# Patient Record
Sex: MALE | Race: WHITE | NOT HISPANIC OR LATINO | Employment: UNEMPLOYED
[De-identification: names, ages, dates, MRNs, and addresses within clinical notes are randomized per-mention and may not be internally consistent; named-entity substitution may affect disease eponyms.]

---
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
## 2024-01-29 ENCOUNTER — TELEPHONE (OUTPATIENT)
Dept: OTOLARYNGOLOGY | Facility: CLINIC | Age: 19
End: 2024-01-29

## 2024-01-29 ENCOUNTER — HOSPITAL ENCOUNTER (EMERGENCY)
Facility: HOSPITAL | Age: 19
Discharge: HOME | End: 2024-01-29
Attending: EMERGENCY MEDICINE | Admitting: EMERGENCY MEDICINE
Payer: COMMERCIAL

## 2024-01-29 ENCOUNTER — APPOINTMENT (OUTPATIENT)
Dept: CT IMAGING | Facility: HOSPITAL | Age: 19
End: 2024-01-29
Attending: EMERGENCY MEDICINE
Payer: COMMERCIAL

## 2024-01-29 VITALS
HEIGHT: 70 IN | DIASTOLIC BLOOD PRESSURE: 61 MMHG | HEART RATE: 79 BPM | BODY MASS INDEX: 21.47 KG/M2 | RESPIRATION RATE: 16 BRPM | OXYGEN SATURATION: 97 % | TEMPERATURE: 99.2 F | WEIGHT: 150 LBS | SYSTOLIC BLOOD PRESSURE: 116 MMHG

## 2024-01-29 DIAGNOSIS — R22.1 MASS OF RIGHT SIDE OF NECK: ICD-10-CM

## 2024-01-29 DIAGNOSIS — K11.6 CYST OF RIGHT PAROTID GLAND: Primary | ICD-10-CM

## 2024-01-29 DIAGNOSIS — J02.0 STREP PHARYNGITIS: ICD-10-CM

## 2024-01-29 DIAGNOSIS — L03.031 PARONYCHIA OF GREAT TOE OF RIGHT FOOT: ICD-10-CM

## 2024-01-29 LAB
ALBUMIN SERPL BCG-MCNC: 4 G/DL (ref 3.5–5.2)
ALP SERPL-CCNC: 78 U/L (ref 40–150)
ALT SERPL W P-5'-P-CCNC: 12 U/L (ref 0–70)
ANION GAP SERPL CALCULATED.3IONS-SCNC: 10 MMOL/L (ref 7–15)
AST SERPL W P-5'-P-CCNC: 15 U/L (ref 0–45)
BASOPHILS # BLD AUTO: 0 10E3/UL (ref 0–0.2)
BASOPHILS NFR BLD AUTO: 0 %
BILIRUB SERPL-MCNC: 0.4 MG/DL
BUN SERPL-MCNC: 10.7 MG/DL (ref 6–20)
CALCIUM SERPL-MCNC: 9.6 MG/DL (ref 8.6–10)
CHLORIDE SERPL-SCNC: 104 MMOL/L (ref 98–107)
CREAT SERPL-MCNC: 1.05 MG/DL (ref 0.67–1.17)
DEPRECATED HCO3 PLAS-SCNC: 26 MMOL/L (ref 22–29)
EGFRCR SERPLBLD CKD-EPI 2021: >90 ML/MIN/1.73M2
EOSINOPHIL # BLD AUTO: 0 10E3/UL (ref 0–0.7)
EOSINOPHIL NFR BLD AUTO: 0 %
ERYTHROCYTE [DISTWIDTH] IN BLOOD BY AUTOMATED COUNT: 11.6 % (ref 10–15)
FLUAV RNA SPEC QL NAA+PROBE: NEGATIVE
FLUBV RNA RESP QL NAA+PROBE: NEGATIVE
GLUCOSE SERPL-MCNC: 101 MG/DL (ref 70–99)
GROUP A STREP BY PCR: DETECTED
HCT VFR BLD AUTO: 43.9 % (ref 40–53)
HGB BLD-MCNC: 15.3 G/DL (ref 13.3–17.7)
IMM GRANULOCYTES # BLD: 0.1 10E3/UL
IMM GRANULOCYTES NFR BLD: 1 %
LYMPHOCYTES # BLD AUTO: 0.9 10E3/UL (ref 0.8–5.3)
LYMPHOCYTES NFR BLD AUTO: 6 %
MCH RBC QN AUTO: 30.7 PG (ref 26.5–33)
MCHC RBC AUTO-ENTMCNC: 34.9 G/DL (ref 31.5–36.5)
MCV RBC AUTO: 88 FL (ref 78–100)
MONOCYTES # BLD AUTO: 1 10E3/UL (ref 0–1.3)
MONOCYTES NFR BLD AUTO: 7 %
NEUTROPHILS # BLD AUTO: 13.4 10E3/UL (ref 1.6–8.3)
NEUTROPHILS NFR BLD AUTO: 86 %
NRBC # BLD AUTO: 0 10E3/UL
NRBC BLD AUTO-RTO: 0 /100
PLATELET # BLD AUTO: 266 10E3/UL (ref 150–450)
POTASSIUM SERPL-SCNC: 4.4 MMOL/L (ref 3.4–5.3)
PROT SERPL-MCNC: 7.6 G/DL (ref 6.4–8.3)
RBC # BLD AUTO: 4.98 10E6/UL (ref 4.4–5.9)
RSV RNA SPEC NAA+PROBE: NEGATIVE
SARS-COV-2 RNA RESP QL NAA+PROBE: NEGATIVE
SODIUM SERPL-SCNC: 140 MMOL/L (ref 135–145)
TSH SERPL DL<=0.005 MIU/L-ACNC: 0.68 UIU/ML (ref 0.3–4.2)
WBC # BLD AUTO: 15.5 10E3/UL (ref 4–11)

## 2024-01-29 PROCEDURE — 10060 I&D ABSCESS SIMPLE/SINGLE: CPT

## 2024-01-29 PROCEDURE — 99285 EMERGENCY DEPT VISIT HI MDM: CPT | Mod: 25

## 2024-01-29 PROCEDURE — 84443 ASSAY THYROID STIM HORMONE: CPT | Performed by: EMERGENCY MEDICINE

## 2024-01-29 PROCEDURE — 87637 SARSCOV2&INF A&B&RSV AMP PRB: CPT | Performed by: EMERGENCY MEDICINE

## 2024-01-29 PROCEDURE — 96375 TX/PRO/DX INJ NEW DRUG ADDON: CPT | Mod: 59

## 2024-01-29 PROCEDURE — 80053 COMPREHEN METABOLIC PANEL: CPT | Performed by: EMERGENCY MEDICINE

## 2024-01-29 PROCEDURE — 36415 COLL VENOUS BLD VENIPUNCTURE: CPT | Performed by: EMERGENCY MEDICINE

## 2024-01-29 PROCEDURE — 250N000011 HC RX IP 250 OP 636: Performed by: EMERGENCY MEDICINE

## 2024-01-29 PROCEDURE — 96374 THER/PROPH/DIAG INJ IV PUSH: CPT | Mod: 59

## 2024-01-29 PROCEDURE — 85025 COMPLETE CBC W/AUTO DIFF WBC: CPT | Performed by: EMERGENCY MEDICINE

## 2024-01-29 PROCEDURE — 87651 STREP A DNA AMP PROBE: CPT | Performed by: EMERGENCY MEDICINE

## 2024-01-29 PROCEDURE — 70491 CT SOFT TISSUE NECK W/DYE: CPT

## 2024-01-29 PROCEDURE — 250N000011 HC RX IP 250 OP 636: Mod: JZ | Performed by: EMERGENCY MEDICINE

## 2024-01-29 RX ORDER — METHYLPREDNISOLONE 4 MG
TABLET, DOSE PACK ORAL
Qty: 21 TABLET | Refills: 0 | Status: SHIPPED | OUTPATIENT
Start: 2024-01-29 | End: 2024-01-29

## 2024-01-29 RX ORDER — METHYLPREDNISOLONE 4 MG
TABLET, DOSE PACK ORAL
Qty: 21 TABLET | Refills: 0 | Status: SHIPPED | OUTPATIENT
Start: 2024-01-29

## 2024-01-29 RX ORDER — AMPICILLIN AND SULBACTAM 2; 1 G/1; G/1
3 INJECTION, POWDER, FOR SOLUTION INTRAMUSCULAR; INTRAVENOUS ONCE
Status: COMPLETED | OUTPATIENT
Start: 2024-01-29 | End: 2024-01-29

## 2024-01-29 RX ORDER — IOPAMIDOL 755 MG/ML
90 INJECTION, SOLUTION INTRAVASCULAR ONCE
Status: COMPLETED | OUTPATIENT
Start: 2024-01-29 | End: 2024-01-29

## 2024-01-29 RX ORDER — DEXAMETHASONE SODIUM PHOSPHATE 4 MG/ML
10 INJECTION, SOLUTION INTRA-ARTICULAR; INTRALESIONAL; INTRAMUSCULAR; INTRAVENOUS; SOFT TISSUE ONCE
Status: COMPLETED | OUTPATIENT
Start: 2024-01-29 | End: 2024-01-29

## 2024-01-29 RX ADMIN — DEXAMETHASONE SODIUM PHOSPHATE 10 MG: 4 INJECTION, SOLUTION INTRA-ARTICULAR; INTRALESIONAL; INTRAMUSCULAR; INTRAVENOUS; SOFT TISSUE at 14:17

## 2024-01-29 RX ADMIN — AMPICILLIN SODIUM AND SULBACTAM SODIUM 3 G: 2; 1 INJECTION, POWDER, FOR SOLUTION INTRAMUSCULAR; INTRAVENOUS at 14:17

## 2024-01-29 RX ADMIN — IOPAMIDOL 90 ML: 755 INJECTION, SOLUTION INTRAVENOUS at 12:26

## 2024-01-29 ASSESSMENT — ACTIVITIES OF DAILY LIVING (ADL)
ADLS_ACUITY_SCORE: 35

## 2024-01-29 NOTE — PROGRESS NOTES
Met with patient  to review role of care management, progression of care and possible need for services at discharge, including OP services, home care, or skilled nursing care. Patient alert, oriented and engaged in the conversation.     Pt comes from [facility]. Prescriptions to be sent to [facility] in [address] [place] [phone]. Updated MD for escript to sent there. Staff to  patient.

## 2024-01-29 NOTE — DISCHARGE INSTRUCTIONS
I spoke with Dr. Roy with the ear nose and throat specialist who has placed a referral for you to see the ear nose and throat specialist as soon as possible at the [facility].  He also ordered an outpatient fine-needle aspiration of the cystic area to determine what this is exactly.  They should contact you to schedule both this as well as your follow-up appointment but if you do not hear from them please call the number above.  Continue the Augmentin orally for 10 days as prescribed.  Take the Medrol Dosepak steroids as prescribed.     Soak the right toe in warm water ideally about 4 times a day to promote continued drainage.  Can follow-up with your primary care doctor for this.     Return to the ER for any new or worsening concerns including worsening neck swelling, difficulty breathing, fever, difficulty swallowing, or any new or worsening concerns.

## 2024-01-29 NOTE — TELEPHONE ENCOUNTER
[facility] Call Center    Phone Message    May a detailed message be left on voicemail: yes     Reason for Call: Other: per referral Cyst of right parotid gland priority 1-2 weeks per  patient ok with being seen @ [facility] please call mom as patient is in treatment she is assisting in appointment set up      Action Taken: Other: ENT    Travel Screening: Not Applicable

## 2024-01-29 NOTE — PROGRESS NOTES
Orders placed for FNA of deep lobe parotid cyst with referral to [facility].

## 2024-01-29 NOTE — ED TRIAGE NOTES
Pt presents to triage from home. Pt reports neck pain and swelling, as well as throat pain, since yesterday. Denies difficulty breathing or airway swelling. Pt also reports right great toe pain. Pt last took ibuprofen at 0815.     Triage Assessment (Adult)       Row Name 01/29/24 0845          Triage Assessment    Airway WDL WDL        Respiratory WDL    Respiratory WDL WDL        Skin Circulation/Temperature WDL    Skin Circulation/Temperature WDL WDL        Cardiac WDL    Cardiac WDL WDL        Peripheral/Neurovascular WDL    Peripheral Neurovascular WDL WDL        Cognitive/Neuro/Behavioral WDL    Cognitive/Neuro/Behavioral WDL WDL

## 2024-01-29 NOTE — ED PROVIDER NOTES
EMERGENCY DEPARTMENT ENCOUNTER      NAME: Jeanette Begum  AGE: 18 year old male  YOB: 2005  MRN: [id number]  EVALUATION DATE & TIME: 1/29/2024  9:45 AM    PCP: Tierney Cardozo [place]    ED PROVIDER: Yvette Monte MD      Chief Complaint   Patient presents with    Pharyngitis    Neck Pain    Toe Pain         FINAL IMPRESSION:  1. Mass of right side of neck    2. Strep pharyngitis    3. Paronychia of great toe of right foot          ED COURSE & MEDICAL DECISION MAKING:    Pertinent Labs & Imaging studies reviewed. (See chart for details)    10:40 AM I introduced myself to the patient, obtained patient history, performed a physical exam, and discussed plan for ED workup including potential diagnostic laboratory/imaging studies and interventions.    18 year old male presents to the Emergency Department for evaluation of right sided neck swelling and right great toe pain.  In terms of the right great toe pain the patient has a paronychia which was anesthetized using a digital block of the right great toe and then drained as documented below.  He tolerated this well.  His bigger concern is the swelling on the right side of his neck that reportedly has developed over the last 24 hours.  Has also had some night sweats which is somewhat concerning for potential malignancy.  Do feel he warrants CT imaging of the neck area to evaluate this further.  Interestingly he does not have any overlying skin changes or signs of cellulitis on external exam.  Dentition is normal and no obvious sign of any dental abscess that would be tracking downward.  Also has no swelling over the parotid gland to suggest parotitis.  This is actually below the jawline.  It is firm and nonmobile and not fluctuant.  Thus felt that an abscess was less likely but obviously on the differential.  Again did have concern for the potential of malignancy as well.  Could also be a very enlarged lymph node however this would be  significantly enlarged.  Laboratory studies were obtained.  We did obtain a TSH although this is not directly over the thyroid.  This is within normal limits.  COVID-19 RSV and influenza PCR was obtained and negative.  He does not have any signs of peritonsillar abscess or epiglottitis on exam.  Did consider the potential of a retropharyngeal abscess however this to be an odd location for this.  Group A strep PCR is positive.  His oropharynx exam is actually somewhat benign with only some mild erythema.  No exudates.  Again no sign of peritonsillar abscess.  He has no signs of respiratory distress and no stridor.  He has no trismus and is tolerating secretions without difficulty.  He also has full range of motion of the neck without meningismus.  White blood cell count is elevated at 15.5.  Hemoglobin 15.3.  CMP is largely unremarkable.  He declined any need for pain medication at this time.      CT soft tissue neck with contrast reveals A cystic mass within the deep lobe of the parotid gland measuring 1.6 x 1.9 cm.  This may represent a type II brachial cleft cyst however a cystic and necrotic neoplasm is also on the differential diagnosis.  Recommend consultation with the ENT for further evaluation.  Mildly enlarged right level 2 and 3 lymph nodes which may be reactive.  Infiltration of subcutaneous fat within the right parotid and submandibular space concerning for surrounding cellulitis.  I did contact Dr. Roy with ENT and his recommendations are documented below.  Patient was given a dose of IV Unasyn here as well as 10 mg of IV Decadron.  Will be prescribed a 10-day course of Augmentin and a Medrol Dosepak per ENT recommendations as well.  Had long discussion with the patient, his father, and his mother via phone about the results and the significant importance that he follow-up closely with ENT as he will need a fine-needle aspiration/biopsy to further determine what this area is.  We did discuss the  potential of malignancy and thus the importance of close follow-up.  Discussed that we will be treating for possible infectious causes with the antibiotics and that the steroids are to reduce inflammation.  They voiced understanding and were comfortable with the plan and discharge.  ENT did not feel that the patient warranted admission and that they could arrange close outpatient follow-up for him.  Dr. Roy did place an order for the FNA as well as follow-up with the [facility] ENT team as soon as possible.  Patient and his family were given follow-up number and information.  Also discussed soaking the right great toe paronychia to promote continued drainage.  Antibiotics will also cover this as well.  Given very strict return precautions to the ER.  They voiced understanding.  Patient was discharged home in stable condition.    ED Course as of 01/30/24 2244   Mon Jan 29, 2024   1140 Soft tissue neck CT w contrast   1329 Dr. Gregg Roy who recommends dose of IV unasyn here and then 10 day course of Augmentin and 10 mg of decadron IV here and then medrol dose pack. He will ensure patient is contacted for follow up at the [facility] and is ordering an outpatient FNA.       At the conclusion of the encounter I discussed the results of all of the tests and the disposition. The questions were answered. The patient or family acknowledged understanding and was agreeable with the care plan.         Medical Decision Making  Obtained supplemental history:Supplemental history obtained?: Documented in chart  Reviewed external records: External records reviewed?: Documented in chart  Care impacted by chronic illness:Mental Health  Care significantly affected by social determinants of health:Access to Medical Care  Did you consider but not order tests?: Work up considered but not performed and documented in chart, if applicable  Did you interpret images independently?: Independent interpretation of ECG and images  noted in documentation, when applicable.  Consultation discussion with other provider:Did you involve another provider (consultant, , pharmacy, etc.)?: I discussed the care with another health care provider, see documentation for details.  Discharge. I prescribed additional prescription strength medication(s) as charted. See documentation for any additional details.      MEDICATIONS GIVEN IN THE EMERGENCY:  Medications   iopamidol (ISOVUE-370) solution 90 mL (90 mLs Intravenous $Given 1/29/24 1226)   ampicillin-sulbactam (UNASYN) 3 g vial to attach to  mL bag (0 g Intravenous Stopped 1/29/24 1432)   dexAMETHasone (DECADRON) injection 10 mg (10 mg Intravenous $Given 1/29/24 1417)       NEW PRESCRIPTIONS STARTED AT TODAY'S ER VISIT  Discharge Medication List as of 1/29/2024  3:53 PM             =================================================================    HPI    Patient information was obtained from: Patient, father      Jeanette Begum is a 18 year old male with a pertinent history of alcohol and cannabis abuse currently in a treatment facility, anxiety, depression who presents to this ED for evaluation of right sided neck swelling.  Patient reports that starting yesterday he started to note that his right side of his neck became swollen.  He reports some mild sore throat as well.  He states the area of the swelling on the neck is painful.  Denies any difficulty breathing or swallowing.  Denies any chest pain.  Denies any known fevers.  States he is currently in a treatment facility for prior alcohol and marijuana abuse.  He has not ever had swelling like this in the past.  Denies any known trauma.  Has normal range of motion of the neck.  No known sick contacts.  Denies any shortness of breath.  No nausea, vomiting, diarrhea, abdominal pain, or other complaints.  He does however state that he has been having some night sweats over the past 3 weeks or so.  He last took ibuprofen at 815 this morning.    He  "also complains of pain and swelling near his right great toenail that has been ongoing for about a week.  He has been soaking it at home and having some purulent drainage.  Is ambulating without difficulty.  Denies any numbness, tingling, or weakness.      REVIEW OF SYSTEMS   Review of Systems   Pertinent positives and negatives are documented in the HPI. All other systems reviewed and are negative.      PAST MEDICAL HISTORY:  No past medical history on file.    PAST SURGICAL HISTORY:  No past surgical history on file.        CURRENT MEDICATIONS:    amoxicillin-clavulanate (AUGMENTIN) 875-125 MG tablet  methylPREDNISolone (MEDROL DOSEPAK) 4 MG tablet therapy pack        ALLERGIES:  No Known Allergies    FAMILY HISTORY:  No family history on file.    SOCIAL HISTORY:   Social History     Socioeconomic History    Marital status: Single       VITALS:  /61   Pulse 79   Temp 99.2  F (37.3  C) (Oral)   Resp 16   Ht 1.778 m (5' 10\")   Wt 68 kg (150 lb)   SpO2 97%   BMI 21.52 kg/m      PHYSICAL EXAM    Physical Exam  Constitutional: Well developed, Well nourished, NAD, GCS 15  HENT: Normocephalic, Atraumatic, Bilateral external ears normal, TMs normal bilaterally, Oropharynx normal, mild tonsillar erythema, uvula is midline and non swollen, no exudate, tolerating secretions without difficulty, no trismus, voice is normal, normal dentition, mucous membranes moist, Nose normal. Neck- Normal range of motion, Supple, No stridor. Large area of right sided lateral neck swelling starting inferior to the jaw line and extending down into the neck, this area is firm, no fluctuance, no overlying erythema or rash, it is not pulsatile, non mobile, mildly tender, no crepitus    Eyes: PERRL, EOMI, Conjunctiva normal, No discharge.   Respiratory: Normal breath sounds, No respiratory distress, No wheezing or crackles, Speaks in full sentences easily.    Cardiovascular: Normal heart rate, Regular rhythm,  No murmurs, No rubs, No " gallops. 2+ radial pulses bilaterally  GI: Bowel sounds normal, Soft, No tenderness, No masses, No rebound or guarding.  Musculoskeletal: 2+ DP pulses. No notable lower extremity edema.  No cyanosis, No clubbing. Good range of motion in all major joints. No tenderness to palpation or major deformities noted. No tenderness of the CTLS spine.   Integument: Warm, Dry, right great toe with paronychia.  No spreading erythema or swelling of the toe otherwise.  Normal range of motion of the right great toe.  Neurologic: Alert & oriented x 3, 5/5 strength in all 4 extremities bilaterally. Sensation intact to light touch in all 4 extremities and the face bilaterally. No focal deficits noted. Normal gait.     Psychiatric: Affect normal, Judgment normal, Mood normal. Cooperative.      LAB:  All pertinent labs reviewed and interpreted.  Results for orders placed or performed during the hospital encounter of 01/29/24   Soft tissue neck CT w contrast    Impression    IMPRESSION:   1.  A marker was placed overlying the right neck mass in the region of the patient's palpable abnormality. Subjacent to the marker is a cystic mass within the deep lobe of the parotid gland measuring 1.6 x 1.9 cm. This may represent a type II brachial   cleft cyst, however a cystic necrotic neoplasm is also on the differential diagnosis. Recommend consultation with otolaryngology for further evaluation.  2.  Mildly enlarged right level II and III lymph nodes, which may be reactive.  3.  Infiltration of the subcutaneous fat within the right parotid and submandibular space, concerning for surrounding cellulitis.    Comprehensive metabolic panel   Result Value Ref Range    Sodium 140 135 - 145 mmol/L    Potassium 4.4 3.4 - 5.3 mmol/L    Carbon Dioxide (CO2) 26 22 - 29 mmol/L    Anion Gap 10 7 - 15 mmol/L    Urea Nitrogen 10.7 6.0 - 20.0 mg/dL    Creatinine 1.05 0.67 - 1.17 mg/dL    GFR Estimate >90 >60 mL/min/1.73m2    Calcium 9.6 8.6 - 10.0 mg/dL     Chloride 104 98 - 107 mmol/L    Glucose 101 (H) 70 - 99 mg/dL    Alkaline Phosphatase 78 40 - 150 U/L    AST 15 0 - 45 U/L    ALT 12 0 - 70 U/L    Protein Total 7.6 6.4 - 8.3 g/dL    Albumin 4.0 3.5 - 5.2 g/dL    Bilirubin Total 0.4 <=1.2 mg/dL   Symptomatic Influenza A/B, RSV, & SARS-CoV2 PCR (COVID-19) Nasopharyngeal    Specimen: Nasopharyngeal; Swab   Result Value Ref Range    Influenza A PCR Negative Negative    Influenza B PCR Negative Negative    RSV PCR Negative Negative    SARS CoV2 PCR Negative Negative   TSH with free T4 reflex   Result Value Ref Range    TSH 0.68 0.30 - 4.20 uIU/mL   CBC with platelets and differential   Result Value Ref Range    WBC Count 15.5 (H) 4.0 - 11.0 10e3/uL    RBC Count 4.98 4.40 - 5.90 10e6/uL    Hemoglobin 15.3 13.3 - 17.7 g/dL    Hematocrit 43.9 40.0 - 53.0 %    MCV 88 78 - 100 fL    MCH 30.7 26.5 - 33.0 pg    MCHC 34.9 31.5 - 36.5 g/dL    RDW 11.6 10.0 - 15.0 %    Platelet Count 266 150 - 450 10e3/uL    % Neutrophils 86 %    % Lymphocytes 6 %    % Monocytes 7 %    % Eosinophils 0 %    % Basophils 0 %    % Immature Granulocytes 1 %    NRBCs per 100 WBC 0 <1 /100    Absolute Neutrophils 13.4 (H) 1.6 - 8.3 10e3/uL    Absolute Lymphocytes 0.9 0.8 - 5.3 10e3/uL    Absolute Monocytes 1.0 0.0 - 1.3 10e3/uL    Absolute Eosinophils 0.0 0.0 - 0.7 10e3/uL    Absolute Basophils 0.0 0.0 - 0.2 10e3/uL    Absolute Immature Granulocytes 0.1 <=0.4 10e3/uL    Absolute NRBCs 0.0 10e3/uL   Group A Streptococcus PCR Throat Swab    Specimen: Throat; Swab   Result Value Ref Range    Group A strep by PCR Detected (A) Not Detected       RADIOLOGY:  Reviewed all pertinent imaging. Please see official radiology report.  Soft tissue neck CT w contrast   Final Result   IMPRESSION:    1.  A marker was placed overlying the right neck mass in the region of the patient's palpable abnormality. Subjacent to the marker is a cystic mass within the deep lobe of the parotid gland measuring 1.6 x 1.9 cm. This may  represent a type II brachial    cleft cyst, however a cystic necrotic neoplasm is also on the differential diagnosis. Recommend consultation with otolaryngology for further evaluation.   2.  Mildly enlarged right level II and III lymph nodes, which may be reactive.   3.  Infiltration of the subcutaneous fat within the right parotid and submandibular space, concerning for surrounding cellulitis.           PROCEDURES:    PROCEDURE: Digital Block   INDICATIONS: Paronychia    PROCEDURE PROVIDER: Dr Yvette Monte   SITE: Right great toe (1st toe)   MEDICATION: 4 mL of 1% Lidocaine without epinephrine   NOTE: The skin overlying the site for injection was prepped with chlorhexidine.  Needle was inserted in a standard three point injection pattern.  Each time the area was aspirated and there was no return of blood.  I then injected the medication at the base of the digit.  The patient had good response to the procedure   COMPLICATIONS: Patient tolerated procedure well, without complication      PROCEDURE: Incision and Drainage   INDICATIONS: Paronychia    PROCEDURE PROVIDER: Dr Yvette Monte   SITE: Right great toe   MEDICATION: Digital block as above   NOTE: The area was prepped with chlorhexidine and draped off in the usual sterile fashion.  Local anesthetic was injected subcutaneously with anesthesia effects demonstrated prior to proceeding.  The area of maximal fluctuance was opened with a #10 using a Single Straight incision to allow for drainage.  The abscess was drained.   A sterile dressing was placed over the area.   COMPLEXITY: Simple    Simple = single, furuncle, paronychia, superficial  Complex = multiple or abscess requiring probing, loculations, packing placement   COMPLICATIONS: Patient tolerated procedure well, without complication            [facility] Documentation:   CMS Diagnoses:               Yvette Monte MD  [facility] EMERGENCY DEPARTMENT  [address]  [facility] [id number]  [phone]      Yvette Monte MD  01/30/24 6891

## 2024-01-30 NOTE — TELEPHONE ENCOUNTER
FUTURE VISIT INFORMATION      FUTURE VISIT INFORMATION:  Date: 2/7/24  Time: 10:40 AM  Location: [facility]  REFERRAL INFORMATION:  Referring provider:  Gregg Roy MD  Referring providers clinic:  [facility]   Reason for visit/diagnosis:  Cyst of right parotid gland referred by Gregg Roy MD in [facility]     RECORDS REQUESTED FROM      Clinic name Comments Records Status Imaging Status   [facility]  1/29/24 referral order -Gregg Roy MD [facility] Emergency Department 1/29/24 ER notes [facility] Imaging CT neck 1/29/24 Epic pACS

## 2024-02-05 NOTE — PROGRESS NOTES
Dear Dr. Roy:    I had the pleasure of meeting Jeanette Begum in consultation today at the [facility] at your request.     History of Present Illness:   Jeanette Begum is a 18 year old man referred for evaluation of a right parotid cyst.    He was seen in the ER on 1/29/2024 for right neck swelling (and toe pain). He reported a 24 hr history of neck swelling. He was recommended for CT neck which showed a deep lobe parotid cystic mass measuring 1.6 x 1.9 cm. He was recommended for an IR guided biopsy by Dr Roy, ordered, but not scheduled.     He says the pain is less than when he presented to the ER. He says that it is harder to move his neck. He says that he notices it with swallowing. He says that he feels like things get caught when swallowing when laying down on the right side. He feels it when he opens his jaw. He says he was put on the steroids in the ER, he finished the steroids on Monday. He is still on the antibiotics.     He is eating a normal diet. He is able to swallow without issue. He has no sticking of pills or vitamins.     He was having nightsweats for about 3 weeks (mom notes intermittently for 2 years). He will soak the bedsheets. He has no weight loss. He had intermittent fevers of 101. He has no chills.     He is due for dental cleaning, 1-1.5 years since last visit. He has no bad taste in the mouth. He has no pain in the teeth.       He is in residential treatment for addiction - marijuana and alcohol. He is going to be there for 2 more weeks, plan to discharge on 2/21.       He is accompanied by his mother and father who supplement history.      Past medical history: anxiety, addiction, possible Peyronies    Past surgical history: varicocele    Social history: Vape. Previously coke, marijuana, alcohol - quit about 3 weeks ago, cocaine about 6 weeks ago. Previously worked for [employer].      Family history: Maternal grandmother with endometrial cancer. Father had melanoma.      MEDICATIONS:     Current Outpatient Medications   Medication Sig Dispense Refill    amoxicillin-clavulanate (AUGMENTIN) 875-125 MG tablet Take 1 tablet by mouth 2 times daily 20 tablet 0    methylPREDNISolone (MEDROL DOSEPAK) 4 MG tablet therapy pack Follow Package Directions (Patient not taking: Reported on 2/7/2024) 21 tablet 0       ALLERGIES:  No Known Allergies    HABITS/SOCIAL HISTORY:   Vape. Previously coke, marijuana, alcohol - quit 1/2024, cocaine about 1/2024.   Previously worked for [employer].      Social History     Socioeconomic History    Marital status: Single     Spouse name: Not on file    Number of children: Not on file    Years of education: Not on file    Highest education level: Not on file   Occupational History    Not on file   Tobacco Use    Smoking status: Every Day     Types: Vaping Device    Smokeless tobacco: Never   Substance and Sexual Activity    Alcohol use: Not on file    Drug use: Not on file    Sexual activity: Not on file   Other Topics Concern    Not on file   Social History Narrative    Not on file     Social Determinants of Health     Financial Resource Strain: Not on file   Food Insecurity: Not on file   Transportation Needs: Not on file   Physical Activity: Not on file   Stress: Not on file   Social Connections: Not on file   Interpersonal Safety: Not on file   Housing Stability: Not on file       PAST MEDICAL HISTORY: No past medical history on file.     PAST SURGICAL HISTORY: No past surgical history on file.    FAMILY HISTORY:  No family history on file.    REVIEW OF SYSTEMS:  12 point ROS was negative other than the symptoms noted above in the HPI.  Patient Supplied Answers to Review of Systems      2/3/2024     7:57 PM    ENT ROS   Constitutional Appetite change    Unexplained fatigue    Problems with sleep    Unexplained fever or night sweats   Neurology Dizzy spells    Headache   Psychology Frequently feeling depressed or sad    Frequently feeling anxious   Ears,  "Nose, Throat Hoarseness   Cardiopulmonary Cough   Musculoskeletal Sore or stiff joints    Neck pain   Allergy/Immunology Rash   Hematologic Lymph node swelling   Endocrine Heat or cold intolerance    Frequent urination         PHYSICAL EXAMINATION:   Ht 1.778 m (5' 10\")   BMI 21.52 kg/m    Appearance:   normal; NAD, age-appropriate appearance, well-developed, normal habitus   Communication:   normal; communicates verbally, normal voice quality   Head/Face:   inspection -  Normal; no scars or visible lesions   Palpation - no facial numbness   Salivary glands -  Normal size, no tenderness, swelling, or palpable masses   Facial strength -  Normal and symmetric bilateral; H/B I/VI   Skin:  normal, no rash   Ears:  auricle (AD) -  normal  EAC (AD) -  normal  TM (AD) -  Normal, no effusion  auricle (AS) -  normal  EAC (AS) -  normal  TM (AS) -  Normal, no effusion  Normal clinical speech reception   Nose:  Ext. inspection -  Normal   Oral Cavity:  lips -  Normal mucosa, oral competence, and stoma size   Age-appropriate dentition, bilaterally partially erupted third molars, healthy gingival mucosa, plaque buildup but no obvious infection   Hard palate, buccal, floor of mouth mucosa normal   Tongue - normal movement, no lesions, no palpable masses   Oropharynx:  mucosa -  Normal, no lesions  soft palate -  Normal, no lesions, no asymmetry, normal elevation  tonsils -  Normal, no exudates, no abnormal lesions, symmetric   Neck: Visible right level II fullness  Right level IIA with palpable lymphadenopathy, firm, mildly tender, about 2-3 cm in size   Lymphatic:  As above   Cardiovascular:  warm, pink, well-perfused extremities without swelling, tenderness, or edema   Respiratory:  Normal respiratory effort, no stridor   Neuro/Psych.:  mood/affect -  normal  mental status -  normal       PROCEDURES:     RESULTS REVIEWED:   I reviewed note from ED, Dr Roy, CT neck report    CT neck imaging independently reviewed - parotid " cyst but also right level II lymphadenopathy    Care discussed with cytology staff      IMPRESSION AND PLAN:   Jeanette Begum is a 18 year old man with a right deep lobe parotid cystic mass.     I reviewed the CT scan. There is a cystic lesion in the parotid. However, there is a mass in the right neck superficial to this which is not clearly commented on by radiology and I think was probably what he noticed when he went to the ER or on exam today - its unlikely that the deep parotid mass was the cause of presentation as it cannot be palpated.     I do not see an obvious infectious source for lymphadenopathy.    Dr Roy ordered FNA, not yet scheduled. I did have pathology perform an FNA today in clinic of the lymphadenopathy, no obvious malignancy on preliminary review but sent for flow. We discussed that he very likely will still need a biopsy of the parotid lesion but I would like to obtain the results of the biopsy today.    We will review his imaging at tumor board on Friday. His mother will be updated with results as patient is currently unreachable in residential facility.    Discussed that he should complete the course of antibiotics but will not represcribe steroids at this time. Discussed that he can take tylenol and ibuprofen for any pain. If pain becomes acutely significant he may need to undergo repeat evaluation in the ER.     Will determine follow-up based on tumor board discussion and FNA results.     Thank you very much for the opportunity to participate in the care of your patient.      Ashleigh Wayne MD  Otolaryngology- Head & Neck Surgery      This note was dictated with voice recognition software and then edited. Please excuse any unintentional errors.       CC:  Gregg Roy MD  [address] [place] [address]

## 2024-02-07 ENCOUNTER — PRE VISIT (OUTPATIENT)
Dept: OTOLARYNGOLOGY | Facility: CLINIC | Age: 19
End: 2024-02-07

## 2024-02-07 ENCOUNTER — OFFICE VISIT (OUTPATIENT)
Dept: OTOLARYNGOLOGY | Facility: CLINIC | Age: 19
End: 2024-02-07
Attending: OTOLARYNGOLOGY
Payer: COMMERCIAL

## 2024-02-07 VITALS — BODY MASS INDEX: 21.52 KG/M2 | HEIGHT: 70 IN

## 2024-02-07 DIAGNOSIS — K11.6 CYST OF RIGHT PAROTID GLAND: Primary | ICD-10-CM

## 2024-02-07 PROCEDURE — 10021 FNA BX W/O IMG GDN 1ST LES: CPT | Mod: GC | Performed by: PATHOLOGY

## 2024-02-07 PROCEDURE — 88305 TISSUE EXAM BY PATHOLOGIST: CPT | Mod: TC | Performed by: OTOLARYNGOLOGY

## 2024-02-07 PROCEDURE — 88184 FLOWCYTOMETRY/ TC 1 MARKER: CPT | Performed by: OTOLARYNGOLOGY

## 2024-02-07 PROCEDURE — 88189 FLOWCYTOMETRY/READ 16 & >: CPT | Mod: GC | Performed by: STUDENT IN AN ORGANIZED HEALTH CARE EDUCATION/TRAINING PROGRAM

## 2024-02-07 PROCEDURE — 88185 FLOWCYTOMETRY/TC ADD-ON: CPT | Performed by: OTOLARYNGOLOGY

## 2024-02-07 PROCEDURE — 99204 OFFICE O/P NEW MOD 45 MIN: CPT | Performed by: OTOLARYNGOLOGY

## 2024-02-07 PROCEDURE — 88184 FLOWCYTOMETRY/ TC 1 MARKER: CPT | Performed by: STUDENT IN AN ORGANIZED HEALTH CARE EDUCATION/TRAINING PROGRAM

## 2024-02-07 PROCEDURE — 88305 TISSUE EXAM BY PATHOLOGIST: CPT | Mod: 26 | Performed by: PATHOLOGY

## 2024-02-07 PROCEDURE — 88173 CYTOPATH EVAL FNA REPORT: CPT | Mod: 26 | Performed by: PATHOLOGY

## 2024-02-07 PROCEDURE — 88172 CYTP DX EVAL FNA 1ST EA SITE: CPT | Mod: 26 | Performed by: PATHOLOGY

## 2024-02-07 ASSESSMENT — PAIN SCALES - GENERAL: PAINLEVEL: NO PAIN (0)

## 2024-02-07 NOTE — LETTER
2/7/2024       RE: Jeanette Begum  [address]  [facility] [place] [address]     Dear Colleague,    Thank you for referring your patient, Jeanette Begum, to the [facility] [place] at [facility]. Please see a copy of my visit note below.    Dear Dr. Roy:    I had the pleasure of meeting Jeanette Begum in consultation today at the [facility] at your request.     History of Present Illness:   Jeanette Begum is a 18 year old man referred for evaluation of a right parotid cyst.    He was seen in the ER on 1/29/2024 for right neck swelling (and toe pain). He reported a 24 hr history of neck swelling. He was recommended for CT neck which showed a deep lobe parotid cystic mass measuring 1.6 x 1.9 cm. He was recommended for an IR guided biopsy by Dr Roy, ordered, but not scheduled.     He says the pain is less than when he presented to the ER. He says that it is harder to move his neck. He says that he notices it with swallowing. He says that he feels like things get caught when swallowing when laying down on the right side. He feels it when he opens his jaw. He says he was put on the steroids in the ER, he finished the steroids on Monday. He is still on the antibiotics.     He is eating a normal diet. He is able to swallow without issue. He has no sticking of pills or vitamins.     He was having nightsweats for about 3 weeks (mom notes intermittently for 2 years). He will soak the bedsheets. He has no weight loss. He had intermittent fevers of 101. He has no chills.     He is due for dental cleaning, 1-1.5 years since last visit. He has no bad taste in the mouth. He has no pain in the teeth.       He is in residential treatment for addiction - marijuana and alcohol. He is going to be there for 2 more weeks, plan to discharge on 2/21.       He is accompanied by his mother and father who supplement history.      Past  medical history: anxiety, addiction, possible Peyronies    Past surgical history: varicocele    Social history: Vape. Previously coke, marijuana, alcohol - quit about 3 weeks ago, cocaine about 6 weeks ago. Previously worked for [employer].      Family history: Maternal grandmother with endometrial cancer. Father had melanoma.     MEDICATIONS:     Current Outpatient Medications   Medication Sig Dispense Refill     amoxicillin-clavulanate (AUGMENTIN) 875-125 MG tablet Take 1 tablet by mouth 2 times daily 20 tablet 0     methylPREDNISolone (MEDROL DOSEPAK) 4 MG tablet therapy pack Follow Package Directions (Patient not taking: Reported on 2/7/2024) 21 tablet 0       ALLERGIES:  No Known Allergies    HABITS/SOCIAL HISTORY:   Vape. Previously coke, marijuana, alcohol - quit 1/2024, cocaine about 1/2024.   Previously worked for [employer].      Social History     Socioeconomic History     Marital status: Single     Spouse name: Not on file     Number of children: Not on file     Years of education: Not on file     Highest education level: Not on file   Occupational History     Not on file   Tobacco Use     Smoking status: Every Day     Types: Vaping Device     Smokeless tobacco: Never   Substance and Sexual Activity     Alcohol use: Not on file     Drug use: Not on file     Sexual activity: Not on file   Other Topics Concern     Not on file   Social History Narrative     Not on file     Social Determinants of Health     Financial Resource Strain: Not on file   Food Insecurity: Not on file   Transportation Needs: Not on file   Physical Activity: Not on file   Stress: Not on file   Social Connections: Not on file   Interpersonal Safety: Not on file   Housing Stability: Not on file       PAST MEDICAL HISTORY: No past medical history on file.     PAST SURGICAL HISTORY: No past surgical history on file.    FAMILY HISTORY:  No family history on file.    REVIEW OF SYSTEMS:  12 point ROS was negative other than the symptoms noted above in the  "HPI.  Patient Supplied Answers to Review of Systems      2/3/2024     7:57 PM   [facility] ROS   Constitutional Appetite change    Unexplained fatigue    Problems with sleep    Unexplained fever or night sweats   Neurology Dizzy spells    Headache   Psychology Frequently feeling depressed or sad    Frequently feeling anxious   Ears, Nose, Throat Hoarseness   Cardiopulmonary Cough   Musculoskeletal Sore or stiff joints    Neck pain   Allergy/Immunology Rash   Hematologic Lymph node swelling   Endocrine Heat or cold intolerance    Frequent urination         PHYSICAL EXAMINATION:   Ht 1.778 m (5' 10\")   BMI 21.52 kg/m    Appearance:   normal; NAD, age-appropriate appearance, well-developed, normal habitus   Communication:   normal; communicates verbally, normal voice quality   Head/Face:   inspection -  Normal; no scars or visible lesions   Palpation - no facial numbness   Salivary glands -  Normal size, no tenderness, swelling, or palpable masses   Facial strength -  Normal and symmetric bilateral; H/B I/VI   Skin:  normal, no rash   Ears:  auricle (AD) -  normal  EAC (AD) -  normal  TM (AD) -  Normal, no effusion  auricle (AS) -  normal  EAC (AS) -  normal  TM (AS) -  Normal, no effusion  Normal clinical speech reception   Nose:  Ext. inspection -  Normal   Oral Cavity:  lips -  Normal mucosa, oral competence, and stoma size   Age-appropriate dentition, bilaterally partially erupted third molars, healthy gingival mucosa, plaque buildup but no obvious infection   Hard palate, buccal, floor of mouth mucosa normal   Tongue - normal movement, no lesions, no palpable masses   Oropharynx:  mucosa -  Normal, no lesions  soft palate -  Normal, no lesions, no asymmetry, normal elevation  tonsils -  Normal, no exudates, no abnormal lesions, symmetric   Neck: Visible right level II fullness  Right level IIA with palpable lymphadenopathy, firm, mildly tender, about 2-3 cm in size   Lymphatic:  As above   Cardiovascular:  warm, " pink, well-perfused extremities without swelling, tenderness, or edema   Respiratory:  Normal respiratory effort, no stridor   Neuro/Psych.:  mood/affect -  normal  mental status -  normal       PROCEDURES:     RESULTS REVIEWED:   I reviewed note from ED, Dr Roy, CT neck report    CT neck imaging independently reviewed - parotid cyst but also right level II lymphadenopathy    Care discussed with cytology staff      IMPRESSION AND PLAN:   Jeanette Begum is a 18 year old man with a right deep lobe parotid cystic mass.     I reviewed the CT scan. There is a cystic lesion in the parotid. However, there is a mass in the right neck superficial to this which is not clearly commented on by radiology and I think was probably what he noticed when he went to the ER or on exam today - its unlikely that the deep parotid mass was the cause of presentation as it cannot be palpated.     I do not see an obvious infectious source for lymphadenopathy.    Dr Roy ordered FNA, not yet scheduled. I did have pathology perform an FNA today in clinic of the lymphadenopathy, no obvious malignancy on preliminary review but sent for flow. We discussed that he very likely will still need a biopsy of the parotid lesion but I would like to obtain the results of the biopsy today.    We will review his imaging at tumor board on Friday. His mother will be updated with results as patient is currently unreachable in residential facility.    Discussed that he should complete the course of antibiotics but will not represcribe steroids at this time. Discussed that he can take tylenol and ibuprofen for any pain. If pain becomes acutely significant he may need to undergo repeat evaluation in the ER.     Will determine follow-up based on tumor board discussion and FNA results.     Thank you very much for the opportunity to participate in the care of your patient.      Ashleigh Wayne MD  Otolaryngology- Head & Neck Surgery      This note was dictated with  voice recognition software and then edited. Please excuse any unintentional errors.       CC:  Gregg Roy MD  [address] [place] [id number]

## 2024-02-09 ENCOUNTER — TUMOR CONFERENCE (OUTPATIENT)
Dept: ONCOLOGY | Facility: CLINIC | Age: 19
End: 2024-02-09
Payer: COMMERCIAL

## 2024-02-09 ENCOUNTER — TELEPHONE (OUTPATIENT)
Dept: OTOLARYNGOLOGY | Facility: CLINIC | Age: 19
End: 2024-02-09

## 2024-02-09 DIAGNOSIS — R22.1 NECK MASS: ICD-10-CM

## 2024-02-09 DIAGNOSIS — R22.1 NECK MASS: Primary | ICD-10-CM

## 2024-02-09 LAB

## 2024-02-09 NOTE — TELEPHONE ENCOUNTER
[facility] Call Center    Phone Message    May a detailed message be left on voicemail: yes     Reason for Call: Other: per patient mom has some more questions and would like to talk with someone please reach out. Thank you      Action Taken: Other: ENT    Travel Screening: Not Applicable

## 2024-02-09 NOTE — TELEPHONE ENCOUNTER
Called patient's mom to review previous questions. Mom verbalized understanding and will call with further questions or concerns.    Shagufta JOSHUAN, RN

## 2024-02-09 NOTE — CONSULTS
Outpatient Neuroradiology Biopsy Referral    Patient is a 19 y/o male with a PMH of right neck lymphadenopathy, right parotid cyst, right neck swelling, cocaine, marijuana, ETOH use. Neuroradiology has been asked to core biopsy right neck lymph node.    Tumor Board 2/9/24 Tumor Board Recommendation:Discussion:   Exam shows palpable LAD in level 2. CT scan R. 2.4cm lymph nodes in level 2. Other enlarged lymph nodes I nright level 2. Edema around lymph nodes and deep neck. The scan has elements of an infectious process. Cystic component that was call a parotid mass is a cystic lymph node. There is no flat plane between the neck lymph node and SM.      Plan:   - Image guided biopsy     Patient is scheduled for a US guided parotid FNA 2/19/24 Message sent to Dr. Wayne for clarification.     CT 1/29/24 IMPRESSION:   1.  A marker was placed overlying the right neck mass in the region of the patient's palpable abnormality. Subjacent to the marker is a cystic mass within the deep lobe of the parotid gland measuring 1.6 x 1.9 cm. This may represent a type II brachial   cleft cyst, however a cystic necrotic neoplasm is also on the differential diagnosis. Recommend consultation with otolaryngology for further evaluation.  2.  Mildly enlarged right level II and III lymph nodes, which may be reactive.  3.  Infiltration of the subcutaneous fat within the right parotid and submandibular space, concerning for surrounding cellulitis.     Case and imaging CT 1/29/24 was reviewed with Dr. Núñez and Dr Oliver from Neuroradiology and US guided right cervical lymph node Core biopsy is approved.     Procedure order, surgical pathology and leukemia lymphoma orders placed.    Patient not on AC at time of referral.     If requesting team would like samples sent for anything else please enter them or notify Neuroradiology prior to scheduled procedure.    Primary team Dr. Wayne ENT made aware of Neuroradiology recommendations via epic  messaging.    SHARON Edwards CNP  Interventional Radiology   IR on-call pager: [phone]

## 2024-02-09 NOTE — TUMOR CONFERENCE
Called patient's mom to review recommendations from tumor conference. Plan for IR guided biopsy of lymph nodes. Reviewed recommendation to have the biopsy completed at the [facility]. Mom verbalized understanding. IR referral placed for core biopsy. Mom will call clinic with further questions or concerns.    Shagufta JOSHUAN, RN

## 2024-02-09 NOTE — TUMOR CONFERENCE
Head & Neck Tumor Conference Note   2024    Status: New  Staff: Dr. Wayne    Tumor Site: R Parotid  Tumor Pathology: TBD  Tumor Stage: TBD  Tumor Treatment: TBD    Reason for Review: Review imaging, path, and POC    Brief History: This is a 18 year old male referred for evaluation of a right parotid cyst. He was seen in the ER on 2024 for right neck swelling (and toe pain). He reported a 24 hr history of neck swelling. He was recommended for CT neck which showed a deep lobe parotid cystic mass measuring 1.6 x 1.9 cm. He was recommended for an IR guided biopsy by Dr Roy, ordered, but not scheduled. When he saw us, he says the pain is less than when he presented to the ER. He says that it is harder to move his neck. He says that he notices it with swallowing. He says that he feels like things get caught when swallowing when laying down on the right side. He feels it when he opens his jaw. He says he was discharged with steroids and antibiotics from the ER. He was having nightsweats for about 3 weeks (mom notes intermittently for 2 years). He has no weight loss. He had intermittent fevers of 101. He is in residential treatment for addiction - marijuana and alcohol. Plan to discharge on . On exam in clinic the parotid mass could not be palpated but he did have a mass in the right neck superficial to the parotid. FNA of the neck mass was performed in clinic.     Pertinent PMH: No past medical history on file. anxiety, addiction, possible Peyronies    Smoking Hx:  Vape. Previously coke, marijuana, alcohol - quit about 3 weeks ago, cocaine about 6 weeks ago. Previously worked for [employer]  Social History     Tobacco Use    Smoking status: Every Day     Types: Vaping Device    Smokeless tobacco: Never       Imagin24  CT Neck  IMPRESSION:   1.  A marker was placed overlying the right neck mass in the region of the patient's palpable abnormality. Subjacent to the marker is a cystic mass within the deep  lobe of the parotid gland measuring 1.6 x 1.9 cm. This may represent a type II brachial   cleft cyst, however a cystic necrotic neoplasm is also on the differential diagnosis. Recommend consultation with otolaryngology for further evaluation.  2.  Mildly enlarged right level II and III lymph nodes, which may be reactive.  3.  Infiltration of the subcutaneous fat within the right parotid and submandibular space, concerning for surrounding cellulitis.     Pathology:   FNA ***    Tumor Board Recommendation:   Discussion:   Exam shows palpable LAD in level 2. CT scan R. 2.4cm lymph nodes in level 2. Other enlarged lymph nodes I nright level 2. Edema around lymph nodes and deep neck. The scan has elements of an infectious process. Cystic component that was call a parotid mass is a cystic lymph node. There is no flat plane between the neck lymph node and SM.     Plan:   - Image guided biopsy       Documentation / Disclaimer Cancer Tumor Board Note  Cancer tumor board recommendations do not override what is determined to be reasonable care and treatment, which is dependent on the circumstances of a patient's case; the patient's medical, social, and personal concerns; and the clinical judgment of the oncologist [physician].

## 2024-02-10 NOTE — TUMOR CONFERENCE
Head & Neck Tumor Conference Note   2024     Status: New  Staff: Dr. Wayne     Tumor Site: R Parotid  Tumor Pathology: TBD  Tumor Stage: TBD  Tumor Treatment: TBD     Reason for Review: Review imaging, path, and POC     Brief History: This is a 18 year old male referred for evaluation of a right parotid cyst. He was seen in the ER on 2024 for right neck swelling (and toe pain). He reported a 24 hr history of neck swelling. He was recommended for CT neck which showed a deep lobe parotid cystic mass measuring 1.6 x 1.9 cm. He was recommended for an IR guided biopsy by Dr Roy, ordered, but not scheduled. When he saw us, he says the pain is less than when he presented to the ER. He says that it is harder to move his neck. He says that he notices it with swallowing. He says that he feels like things get caught when swallowing when laying down on the right side. He feels it when he opens his jaw. He says he was discharged with steroids and antibiotics from the ER. He was having nightsweats for about 3 weeks (mom notes intermittently for 2 years). He has no weight loss. He had intermittent fevers of 101. He is in residential treatment for addiction - marijuana and alcohol. Plan to discharge on . On exam in clinic the parotid mass could not be palpated but he did have a mass in the right neck superficial to the parotid. FNA of the neck mass was performed in clinic.      Pertinent PMH:   Past Medical History   No past medical history on file.    anxiety, addiction, possible Peyronies     Smoking Hx:  Vape. Previously coke, marijuana, alcohol - quit about 3 weeks ago, cocaine about 6 weeks ago. Previously worked for [employer]  Social History            Tobacco Use    Smoking status: Every Day       Types: Vaping Device    Smokeless tobacco: Never         Imagin24  CT Neck  IMPRESSION:   1.  A marker was placed overlying the right neck mass in the region of the patient's palpable abnormality. Subjacent to  the marker is a cystic mass within the deep lobe of the parotid gland measuring 1.6 x 1.9 cm. This may represent a type II brachial   cleft cyst, however a cystic necrotic neoplasm is also on the differential diagnosis. Recommend consultation with otolaryngology for further evaluation.  2.  Mildly enlarged right level II and III lymph nodes, which may be reactive.  3.  Infiltration of the subcutaneous fat within the right parotid and submandibular space, concerning for surrounding cellulitis.      Pathology:   FNA    Specimen A                 Interpretation:                  Negative for malignancy  Polymorphous population of lymphocytes noted admixed with scant neutrophils (see comment)                 Adequacy:                 Satisfactory for evaluation    Tumor Board Recommendation:   Discussion:   Exam shows palpable LAD in level 2. CT scan demonstrates a R. Level 2 lymph node measuring 2.4cm. This node is very cystic. There is no flat plane between this node and the SCM. There are numerous other enlarged lymph nodes located in level 2 and 3. The mass read as a parotid mass on the final read on the CT borrego appears to be a cystic lymph node.  There is edema around lymph nodes and the deep neck consistent with infectious process. The patient has not responded to antimicrobial therapy if this was a common bacterial entity. The pathological specimen that was obtained only showed scant material therefore we would recommend another sampling.      Plan:   - Image guided biopsy         Documentation / Disclaimer Cancer Tumor Board Note  Cancer tumor board recommendations do not override what is determined to be reasonable care and treatment, which is dependent on the circumstances of a patient's case; the patient's medical, social, and personal concerns; and the clinical judgment of the oncologist [physician].

## 2024-02-14 ENCOUNTER — TELEPHONE (OUTPATIENT)
Dept: OTOLARYNGOLOGY | Facility: CLINIC | Age: 19
End: 2024-02-14
Payer: COMMERCIAL

## 2024-02-14 ENCOUNTER — PATIENT OUTREACH (OUTPATIENT)
Dept: OTOLARYNGOLOGY | Facility: CLINIC | Age: 19
End: 2024-02-14
Payer: COMMERCIAL

## 2024-02-14 NOTE — TELEPHONE ENCOUNTER
Called patient mom to give her the number to schedule the IR biopsy. Patient mom was understanding of who to call. Patient mom requested that we send over imaging request to other health care facility. Patient's mom will reach out if she has any questions or concerns in the meantime. Kenna Tam RN on 2/14/2024 at 3:34 PM    
[facility] Call Center    Phone Message    May a detailed message be left on voicemail: yes     Reason for Call: Per pt's mom she is waiting to schedule the biopsy, She still has not been contacted. Please call to discuss. Thank you    Action Taken: Message routed to:  [facility] ([facility] ENT    Travel Screening: Not Applicable                                                                   
15-Dec-2019 06:06

## 2024-02-19 ENCOUNTER — HOSPITAL ENCOUNTER (OUTPATIENT)
Dept: ULTRASOUND IMAGING | Facility: CLINIC | Age: 19
Discharge: HOME | End: 2024-02-19
Attending: OTOLARYNGOLOGY | Admitting: OTOLARYNGOLOGY
Payer: COMMERCIAL

## 2024-02-19 DIAGNOSIS — K11.6 CYST OF RIGHT PAROTID GLAND: ICD-10-CM

## 2024-02-19 PROCEDURE — 88184 FLOWCYTOMETRY/ TC 1 MARKER: CPT | Performed by: PATHOLOGY

## 2024-02-19 PROCEDURE — 88305 TISSUE EXAM BY PATHOLOGIST: CPT | Mod: 26 | Performed by: PATHOLOGY

## 2024-02-19 PROCEDURE — 88342 IMHCHEM/IMCYTCHM 1ST ANTB: CPT | Mod: TC,XU | Performed by: OTOLARYNGOLOGY

## 2024-02-19 PROCEDURE — 88344 IMHCHEM/IMCYTCHM EA MLT ANTB: CPT | Mod: 26 | Performed by: PATHOLOGY

## 2024-02-19 PROCEDURE — 88185 FLOWCYTOMETRY/TC ADD-ON: CPT | Performed by: OTOLARYNGOLOGY

## 2024-02-19 PROCEDURE — 88368 INSITU HYBRIDIZATION MANUAL: CPT | Performed by: PATHOLOGY

## 2024-02-19 PROCEDURE — 88189 FLOWCYTOMETRY/READ 16 & >: CPT | Performed by: PATHOLOGY

## 2024-02-19 PROCEDURE — 88173 CYTOPATH EVAL FNA REPORT: CPT | Mod: 26 | Performed by: PATHOLOGY

## 2024-02-19 PROCEDURE — 88342 IMHCHEM/IMCYTCHM 1ST ANTB: CPT | Mod: 26 | Performed by: PATHOLOGY

## 2024-02-19 PROCEDURE — 88365 INSITU HYBRIDIZATION (FISH): CPT | Mod: 26 | Performed by: PATHOLOGY

## 2024-02-19 PROCEDURE — 10005 FNA BX W/US GDN 1ST LES: CPT

## 2024-02-19 PROCEDURE — 88312 SPECIAL STAINS GROUP 1: CPT | Mod: 26 | Performed by: PATHOLOGY

## 2024-02-19 PROCEDURE — 88341 IMHCHEM/IMCYTCHM EA ADD ANTB: CPT | Mod: 26 | Performed by: PATHOLOGY

## 2024-02-19 PROCEDURE — 88377 M/PHMTRC ALYS ISHQUANT/SEMIQ: CPT | Performed by: PATHOLOGY

## 2024-02-19 PROCEDURE — 88184 FLOWCYTOMETRY/ TC 1 MARKER: CPT | Performed by: OTOLARYNGOLOGY

## 2024-02-19 PROCEDURE — 87070 CULTURE OTHR SPECIMN AEROBIC: CPT | Performed by: OTOLARYNGOLOGY

## 2024-02-19 PROCEDURE — 88173 CYTOPATH EVAL FNA REPORT: CPT | Mod: TC,XS | Performed by: OTOLARYNGOLOGY

## 2024-02-19 PROCEDURE — 88172 CYTP DX EVAL FNA 1ST EA SITE: CPT | Mod: 26 | Performed by: PATHOLOGY

## 2024-02-19 PROCEDURE — 84999 UNLISTED CHEMISTRY PROCEDURE: CPT | Performed by: PATHOLOGY

## 2024-02-20 LAB
PATH REPORT.COMMENTS IMP SPEC: NORMAL
PATH REPORT.FINAL DX SPEC: NORMAL
PATH REPORT.MICROSCOPIC SPEC OTHER STN: NORMAL
PATH REPORT.RELEVANT HX SPEC: NORMAL

## 2024-02-23 LAB
BACTERIA ASPIRATE CULT: ABNORMAL
PATH REPORT.ADDENDUM SPEC: NORMAL
PATH REPORT.COMMENTS IMP SPEC: NORMAL
PATH REPORT.FINAL DX SPEC: NORMAL
PATH REPORT.GROSS SPEC: NORMAL
PATH REPORT.MICROSCOPIC SPEC OTHER STN: NORMAL
PATH REPORT.RELEVANT HX SPEC: NORMAL

## 2024-02-25 ENCOUNTER — HEALTH MAINTENANCE LETTER (OUTPATIENT)
Age: 19
End: 2024-02-25

## 2024-03-22 RX ORDER — LIDOCAINE 40 MG/G
CREAM TOPICAL
Status: CANCELLED | OUTPATIENT
Start: 2024-03-22

## 2024-03-22 RX ORDER — SODIUM CHLORIDE 9 MG/ML
INJECTION, SOLUTION INTRAVENOUS CONTINUOUS
Status: CANCELLED | OUTPATIENT
Start: 2024-03-22

## 2024-03-28 ENCOUNTER — HOSPITAL ENCOUNTER (OUTPATIENT)
Facility: CLINIC | Age: 19
Discharge: HOME | End: 2024-03-28
Attending: INTERNAL MEDICINE | Admitting: INTERNAL MEDICINE
Payer: COMMERCIAL

## 2024-03-28 ENCOUNTER — APPOINTMENT (OUTPATIENT)
Dept: MEDSURG UNIT | Facility: CLINIC | Age: 19
End: 2024-03-28
Attending: INTERNAL MEDICINE
Payer: COMMERCIAL

## 2024-03-28 ENCOUNTER — HOSPITAL ENCOUNTER (OUTPATIENT)
Dept: INTERVENTIONAL RADIOLOGY/VASCULAR | Facility: CLINIC | Age: 19
Discharge: HOME | End: 2024-03-28
Attending: OTOLARYNGOLOGY | Admitting: INTERNAL MEDICINE
Payer: COMMERCIAL

## 2024-03-28 VITALS
RESPIRATION RATE: 16 BRPM | HEART RATE: 64 BPM | BODY MASS INDEX: 23.38 KG/M2 | DIASTOLIC BLOOD PRESSURE: 90 MMHG | OXYGEN SATURATION: 99 % | SYSTOLIC BLOOD PRESSURE: 130 MMHG | WEIGHT: 162.92 LBS | TEMPERATURE: 98.1 F

## 2024-03-28 VITALS
RESPIRATION RATE: 16 BRPM | HEART RATE: 74 BPM | OXYGEN SATURATION: 97 % | BODY MASS INDEX: 23.38 KG/M2 | DIASTOLIC BLOOD PRESSURE: 64 MMHG | HEIGHT: 70 IN | SYSTOLIC BLOOD PRESSURE: 139 MMHG

## 2024-03-28 DIAGNOSIS — R22.1 NECK MASS: ICD-10-CM

## 2024-03-28 LAB
ERYTHROCYTE [DISTWIDTH] IN BLOOD BY AUTOMATED COUNT: 12.5 % (ref 10–15)
HCT VFR BLD AUTO: 41.6 % (ref 40–53)
HGB BLD-MCNC: 14.7 G/DL (ref 13.3–17.7)
INR BLD: 1.1 (ref 2–3)
INR PPP: 1.14 (ref 0.85–1.15)
MCH RBC QN AUTO: 31.2 PG (ref 26.5–33)
MCHC RBC AUTO-ENTMCNC: 35.3 G/DL (ref 31.5–36.5)
MCV RBC AUTO: 88 FL (ref 78–100)
PLATELET # BLD AUTO: 216 10E3/UL (ref 150–450)
RBC # BLD AUTO: 4.71 10E6/UL (ref 4.4–5.9)
WBC # BLD AUTO: 7.9 10E3/UL (ref 4–11)

## 2024-03-28 PROCEDURE — 99153 MOD SED SAME PHYS/QHP EA: CPT

## 2024-03-28 PROCEDURE — 258N000003 HC RX IP 258 OP 636: Performed by: NURSE PRACTITIONER

## 2024-03-28 PROCEDURE — 88189 FLOWCYTOMETRY/READ 16 & >: CPT | Performed by: STUDENT IN AN ORGANIZED HEALTH CARE EDUCATION/TRAINING PROGRAM

## 2024-03-28 PROCEDURE — 85027 COMPLETE CBC AUTOMATED: CPT | Performed by: NURSE PRACTITIONER

## 2024-03-28 PROCEDURE — 36415 COLL VENOUS BLD VENIPUNCTURE: CPT | Performed by: NURSE PRACTITIONER

## 2024-03-28 PROCEDURE — 88360 TUMOR IMMUNOHISTOCHEM/MANUAL: CPT | Mod: 26 | Performed by: STUDENT IN AN ORGANIZED HEALTH CARE EDUCATION/TRAINING PROGRAM

## 2024-03-28 PROCEDURE — 76942 ECHO GUIDE FOR BIOPSY: CPT | Mod: 26 | Performed by: RADIOLOGY

## 2024-03-28 PROCEDURE — 88341 IMHCHEM/IMCYTCHM EA ADD ANTB: CPT | Mod: 26 | Performed by: STUDENT IN AN ORGANIZED HEALTH CARE EDUCATION/TRAINING PROGRAM

## 2024-03-28 PROCEDURE — 85610 PROTHROMBIN TIME: CPT | Performed by: NURSE PRACTITIONER

## 2024-03-28 PROCEDURE — 88365 INSITU HYBRIDIZATION (FISH): CPT | Mod: 26 | Performed by: STUDENT IN AN ORGANIZED HEALTH CARE EDUCATION/TRAINING PROGRAM

## 2024-03-28 PROCEDURE — 88185 FLOWCYTOMETRY/TC ADD-ON: CPT | Performed by: OTOLARYNGOLOGY

## 2024-03-28 PROCEDURE — 38505 NEEDLE BIOPSY LYMPH NODES: CPT | Mod: RT | Performed by: RADIOLOGY

## 2024-03-28 PROCEDURE — 99152 MOD SED SAME PHYS/QHP 5/>YRS: CPT

## 2024-03-28 PROCEDURE — 88342 IMHCHEM/IMCYTCHM 1ST ANTB: CPT | Mod: 26 | Performed by: STUDENT IN AN ORGANIZED HEALTH CARE EDUCATION/TRAINING PROGRAM

## 2024-03-28 PROCEDURE — 88360 TUMOR IMMUNOHISTOCHEM/MANUAL: CPT | Mod: TC | Performed by: OTOLARYNGOLOGY

## 2024-03-28 PROCEDURE — 999N000142 HC STATISTIC PROCEDURE PREP ONLY

## 2024-03-28 PROCEDURE — 88305 TISSUE EXAM BY PATHOLOGIST: CPT | Mod: 26 | Performed by: STUDENT IN AN ORGANIZED HEALTH CARE EDUCATION/TRAINING PROGRAM

## 2024-03-28 PROCEDURE — 999N000132 HC STATISTIC PP CARE STAGE 1

## 2024-03-28 PROCEDURE — 99152 MOD SED SAME PHYS/QHP 5/>YRS: CPT | Mod: GC | Performed by: RADIOLOGY

## 2024-03-28 PROCEDURE — 88184 FLOWCYTOMETRY/ TC 1 MARKER: CPT | Performed by: OTOLARYNGOLOGY

## 2024-03-28 PROCEDURE — 250N000009 HC RX 250: Performed by: STUDENT IN AN ORGANIZED HEALTH CARE EDUCATION/TRAINING PROGRAM

## 2024-03-28 PROCEDURE — 85610 PROTHROMBIN TIME: CPT

## 2024-03-28 PROCEDURE — 250N000011 HC RX IP 250 OP 636: Performed by: STUDENT IN AN ORGANIZED HEALTH CARE EDUCATION/TRAINING PROGRAM

## 2024-03-28 RX ORDER — NALOXONE HYDROCHLORIDE 0.4 MG/ML
0.2 INJECTION, SOLUTION INTRAMUSCULAR; INTRAVENOUS; SUBCUTANEOUS
Status: DISCONTINUED | OUTPATIENT
Start: 2024-03-28 | End: 2024-03-29 | Stop reason: HOSPADM

## 2024-03-28 RX ORDER — LIDOCAINE 40 MG/G
CREAM TOPICAL
Status: DISCONTINUED | OUTPATIENT
Start: 2024-03-28 | End: 2024-03-29 | Stop reason: HOSPADM

## 2024-03-28 RX ORDER — NALOXONE HYDROCHLORIDE 0.4 MG/ML
0.4 INJECTION, SOLUTION INTRAMUSCULAR; INTRAVENOUS; SUBCUTANEOUS
Status: DISCONTINUED | OUTPATIENT
Start: 2024-03-28 | End: 2024-03-29 | Stop reason: HOSPADM

## 2024-03-28 RX ORDER — SODIUM CHLORIDE 9 MG/ML
INJECTION, SOLUTION INTRAVENOUS CONTINUOUS
Status: DISCONTINUED | OUTPATIENT
Start: 2024-03-28 | End: 2024-03-29 | Stop reason: HOSPADM

## 2024-03-28 RX ORDER — FLUMAZENIL 0.1 MG/ML
0.2 INJECTION, SOLUTION INTRAVENOUS
Status: DISCONTINUED | OUTPATIENT
Start: 2024-03-28 | End: 2024-03-29 | Stop reason: HOSPADM

## 2024-03-28 RX ORDER — LIDOCAINE HYDROCHLORIDE 10 MG/ML
1-30 INJECTION, SOLUTION EPIDURAL; INFILTRATION; INTRACAUDAL; PERINEURAL
Status: COMPLETED | OUTPATIENT
Start: 2024-03-28 | End: 2024-03-28

## 2024-03-28 RX ORDER — FENTANYL CITRATE 50 UG/ML
25-50 INJECTION, SOLUTION INTRAMUSCULAR; INTRAVENOUS EVERY 5 MIN PRN
Status: DISCONTINUED | OUTPATIENT
Start: 2024-03-28 | End: 2024-03-29 | Stop reason: HOSPADM

## 2024-03-28 RX ORDER — HYDROXYZINE HYDROCHLORIDE 25 MG/1
25 TABLET, FILM COATED ORAL
COMMUNITY

## 2024-03-28 RX ADMIN — MIDAZOLAM 1 MG: 1 INJECTION INTRAMUSCULAR; INTRAVENOUS at 10:05

## 2024-03-28 RX ADMIN — FENTANYL CITRATE 50 MCG: 50 INJECTION, SOLUTION INTRAMUSCULAR; INTRAVENOUS at 09:58

## 2024-03-28 RX ADMIN — LIDOCAINE HYDROCHLORIDE 4 ML: 10 INJECTION, SOLUTION EPIDURAL; INFILTRATION; INTRACAUDAL; PERINEURAL at 10:10

## 2024-03-28 RX ADMIN — SODIUM CHLORIDE: 9 INJECTION, SOLUTION INTRAVENOUS at 08:31

## 2024-03-28 RX ADMIN — FENTANYL CITRATE 25 MCG: 50 INJECTION, SOLUTION INTRAMUSCULAR; INTRAVENOUS at 10:28

## 2024-03-28 RX ADMIN — FENTANYL CITRATE 50 MCG: 50 INJECTION, SOLUTION INTRAMUSCULAR; INTRAVENOUS at 10:05

## 2024-03-28 RX ADMIN — MIDAZOLAM 0.5 MG: 1 INJECTION INTRAMUSCULAR; INTRAVENOUS at 10:28

## 2024-03-28 RX ADMIN — MIDAZOLAM 1 MG: 1 INJECTION INTRAMUSCULAR; INTRAVENOUS at 09:58

## 2024-03-28 ASSESSMENT — ACTIVITIES OF DAILY LIVING (ADL)
ADLS_ACUITY_SCORE: 35

## 2024-03-28 NOTE — SEDATION DOCUMENTATION
[facility] Procedure Note        Sedation:      Performed by: Magdi Lechuga MD  Authorized by: Magdi Lechuga MD    Pre-Procedure Assessment done at 0830.    Expected Level:  Moderate Sedation    Indication:  Sedation is required to allow for  biopsy    Consent obtained from patient after discussing the risks, benefits and alternatives.    PO Intake:  Appropriately NPO for procedure    ASA Class:  Class 1 - HEALTHY PATIENT    Mallampati:  Grade 1:  Soft palate, uvula, tonsillar pillars, and posterior pharyngeal wall visible    Lungs: Lungs Clear with good breath sounds bilaterally.     Heart: Normal heart sounds and rate    History and physical reviewed and no updates needed. I have reviewed the lab findings, diagnostic data, medications, and the plan for sedation. I have determined this patient to be an appropriate candidate for the planned sedation and procedure and have reassessed the patient IMMEDIATELY PRIOR to sedation and procedure.

## 2024-03-28 NOTE — PROGRESS NOTES
Pt has tolerated recovery. Right neck site remains CDI, soft, flat, non tender. Pt ambulates in hicks with steady gait. Discharge instructions reviewed with pt, pt verbalizes understanding. PIV discontinued.  1155 Pt transported to [facility] via wheelchair by NST. Mom and dad with pt and will be transporting pt home.

## 2024-03-28 NOTE — IR NOTE
Patient Name: Jeanette Begum  Medical Record Number: [id number]  Today's Date: 3/28/2024    Procedure: image guided biopsy of a right cervical chain lymph node  Proceduralist: Dr VIC Goodwin    Sedation start time: 0958  Sedation end time: 1038  Sedation medications administered: 2.5 mg versed, 125 mcg fentanyl  Total sedation time: 40 min  Sedation Notes: none     Procedure start time: 1007  Procedure end time: 1038    Report given to: Shayla MCDUFFIE   : none    Other Notes: Pt arrived to IR room 6 from . Consent reviewed, pt confirmed. Pt denies any questions or concerns regarding procedure. Pt positioned supine and monitored per protocol. Pathology not present for procedure - Dr GHASSAN Lechuga declined pathology support. Specimens sent as ordered. Right neck site cleansed and dressed per protocol. Pt tolerated procedure without any noted complications. Pt transferred back to .

## 2024-03-28 NOTE — PROGRESS NOTES
Pt returns to 2a s/p right cervical chain lymph node biopsy. Right neck site CDI, soft, flat, non tender. Pt alert, denies pain, tolerating PO.

## 2024-03-28 NOTE — PROGRESS NOTES
Pt arrives to 2a, with mom and dad, for lymph node biopsy. H&P is up to date. Consent is currently being signed. Labs in process.

## 2024-03-28 NOTE — DISCHARGE INSTRUCTIONS
[facility]    Interventional Radiology  Patient Instructions Following Biopsy    AFTER YOU GO HOME  If you were given sedation, for the first 24 hours: we recommend that an adult stay with you, DO NOT drive or operate machinery at home or at work, DO NOT smoke, DO NOT make any important or legal decisions.  DO NOT drink alcoholic beverages the day of your procedure  Drink plenty of fluids   Resume your regular diet, unless otherwise instructed by your Primary Physician  Relax and take it easy for 48 hours  DO NOT do any strenuous exercise or lifting (> 10 lbs) for at least 7 days following your procedure  Keep the dressing dry and in place for 24 hours. Replace with Band aid for 2 days.  Never leave a wet dressing in place.  Do not take a shower for at least 12 hours following your procedure. No tub bath, hot tub, or swimming for 5 days.  There should be minimum drainage from the biopsy site    CALL THE PHYSICIAN IF:  You start bleeding from the procedure site.  If you do start to bleed from that site, lie down flat and hold pressure on the site for a minimum of 10 minutes.  Your physician will tell you if you need to return to the hospital  You develop nausea or vomiting  You have excessive swelling, redness, or tenderness at the site  You have drainage that looks like it is infected.  You experience severe pain  You develop hives or a rash or unexplained itching  You develop shortness of breath  You develop a temperature of 101 degrees F or greater    [facility] INTERVENTIONAL RADIOLOGY DEPARTMENT  Procedure Physicians: Dr Coker, Dr Lechuga                                       Date of procedure: March 28, 2024  Telephone Numbers: [phone]      Monday-Friday 7:30 am to 4:00 pm  [phone] After 4:00 pm Monday-Friday, Weekends & Holidays.   Ask for the Neuro-Radiologist on call.  Someone is on call 24 hrs/day  [facility] toll free number: [phone] Monday-Friday 8:00 am to 4:30 pm  [facility] Emergency  Dept: [phone]

## 2024-04-01 LAB
PATH REPORT.COMMENTS IMP SPEC: NORMAL
PATH REPORT.FINAL DX SPEC: NORMAL
PATH REPORT.GROSS SPEC: NORMAL
PATH REPORT.MICROSCOPIC SPEC OTHER STN: NORMAL
PATH REPORT.RELEVANT HX SPEC: NORMAL
PHOTO IMAGE: NORMAL

## 2024-04-05 ENCOUNTER — TUMOR CONFERENCE (OUTPATIENT)
Dept: ONCOLOGY | Facility: CLINIC | Age: 19
End: 2024-04-05
Payer: COMMERCIAL

## 2024-04-05 ENCOUNTER — TELEPHONE (OUTPATIENT)
Dept: OTOLARYNGOLOGY | Facility: CLINIC | Age: 19
End: 2024-04-05

## 2024-04-05 DIAGNOSIS — R22.1 NECK MASS: Primary | ICD-10-CM

## 2024-04-05 NOTE — TUMOR CONFERENCE
Head & Neck Tumor Conference Note   4/5/24     Status: Established (last discussed 2/9)  Staff: Dr. Wayne     Tumor Site: R Parotid  Tumor Pathology: TBD  Tumor Stage: TBD  Tumor Treatment: TBD     Reason for Review: Review imaging, path, and POC     Brief History: This is a 18 year old male referred for evaluation of a right parotid cyst. He was seen in the ER on 1/29/2024 for right neck swelling (and toe pain). He reported a 24 hr history of neck swelling. He was recommended for CT neck which showed a deep lobe parotid cystic mass measuring 1.6 x 1.9 cm. He was recommended for an IR guided biopsy by Dr Roy, ordered, but not scheduled. When he saw us, he says the pain is less than when he presented to the ER. He says that it is harder to move his neck. He says that he notices it with swallowing. He says that he feels like things get caught when swallowing when laying down on the right side. He feels it when he opens his jaw. He says he was discharged with steroids and antibiotics from the ER. He was having nightsweats for about 3 weeks (mom notes intermittently for 2 years). He has no weight loss. He had intermittent fevers of 101. He is in residential treatment for addiction - marijuana and alcohol. Plan to discharge on 2/21. On exam in clinic the parotid mass could not be palpated but he did have a mass in the right neck superficial to the parotid. FNA of the neck mass was performed in clinic. Previously recommended IR guided biopsy.     Exam shows palpable LAD in level 2. CT scan demonstrates a R. Level 2 lymph node measuring 2.4cm. This node is very cystic. There is no flat plane between this node and the SCM. There are numerous other enlarged lymph nodes located in level 2 and 3. The mass read as a parotid mass on the final read on the CT borrego appears to be a cystic lymph node.  There is edema around lymph nodes and the deep neck consistent with infectious process. The patient has not responded to  antimicrobial therapy if this was a common bacterial entity. The pathological specimen that was obtained only showed scant material and it was biopsied again with a core needle biopsy by interventional radiology.      Pertinent PMH:   Past Medical History   No past medical history on file.    anxiety, addiction, possible Peyronies     Smoking Hx:  Vape. Previously coke, marijuana, alcohol - quit about 3 weeks ago, cocaine about 6 weeks ago. Previously worked for [employer]  Social History                Tobacco Use    Smoking status: Every Day       Types: Vaping Device    Smokeless tobacco: Never         Imagin24  CT Neck  IMPRESSION:   1.  A marker was placed overlying the right neck mass in the region of the patient's palpable abnormality. Subjacent to the marker is a cystic mass within the deep lobe of the parotid gland measuring 1.6 x 1.9 cm. This may represent a type II brachial   cleft cyst, however a cystic necrotic neoplasm is also on the differential diagnosis. Recommend consultation with otolaryngology for further evaluation.  2.  Mildly enlarged right level II and III lymph nodes, which may be reactive.  3.  Infiltration of the subcutaneous fat within the right parotid and submandibular space, concerning for surrounding cellulitis.      Pathology:     Final Diagnosis   Lymph node, right cervical, needle core biopsy:  - Portions of benign lymph node with follicular hyperplasia  - Negative for EBV by in situ hybridization  - See comment       FNA    Specimen A                 Interpretation:                  Negative for malignancy  Polymorphous population of lymphocytes noted admixed with scant neutrophils (see comment)                 Adequacy:                 Satisfactory for evaluation     Tumor Board Recommendation:   Discussion:   Review of the imaging shows the lesion that has inflammatory component and it could be infectious too. He has edema in the neck as well. Would recommend repeating scan  to see if it resolves. Flow cytometry is negative and there is no evidence that the pathology was inadequate.      Plan:   - Repeat imaging        Documentation / Disclaimer Cancer Tumor Board Note  Cancer tumor board recommendations do not override what is determined to be reasonable care and treatment, which is dependent on the circumstances of a patient's case; the patient's medical, social, and personal concerns; and the clinical judgment of the oncologist [physician].

## 2024-04-05 NOTE — TUMOR CONFERENCE
Called patient's mom to review recommendations from tumor conference. Plan to repeat CT neck in a few weeks for follow up. Mom verbalized understanding of plan of care. Will have scheduling team contact patient's mom to schedule.    Shagufta JOSHUAN, RN

## 2025-04-26 ENCOUNTER — HEALTH MAINTENANCE LETTER (OUTPATIENT)
Age: 20
End: 2025-04-26

## 2025-07-31 ENCOUNTER — LAB (OUTPATIENT)
Dept: LAB | Facility: CLINIC | Age: 20
End: 2025-07-31
Payer: COMMERCIAL

## 2025-07-31 DIAGNOSIS — N46.11 OLIGOSPERMIA: Primary | ICD-10-CM

## 2025-07-31 DIAGNOSIS — I86.1 VARICOCELE: ICD-10-CM

## 2025-07-31 PROCEDURE — 89320 SEMEN ANAL VOL/COUNT/MOT: CPT

## 2025-08-01 LAB
ABSTINENCE DAYS: 4 DAYS (ref 2–7)
AGGLUTINATION: NO
ANALYSIS TEMP - CENTIGRADE: 22 CENTIGRADE
COLLECTION METHOD: ABNORMAL
COLLECTION SITE: ABNORMAL
CONSENT TO RELEASE TO PARTNER: NO
DAL- RECEIVED TIME: ABNORMAL
IMMOTILE: 37 %
LIQUEFIED: YES
NON-PROGRESSIVE MOTILITY: 8 %
PROGRESSIVE MOTILITY: 55 %
ROUND CELLS: 0.6 MILLION/ML
SPECIMEN PH: 7.2 PH
SPECIMEN VOLUME: 4.3 ML
SPERM CONCENTRATION: 0.9 MILLION/ML
TIME OF ANALYSIS: ABNORMAL
TOTAL PROGRESSIVE MOTILE NUMBER: 2 MILLION
TOTAL SPERM NUMBER: 4 MILLION
VISCOUS: NO
VITALITY: ABNORMAL

## (undated) RX ORDER — FENTANYL CITRATE 50 UG/ML
INJECTION, SOLUTION INTRAMUSCULAR; INTRAVENOUS
Status: DISPENSED
Start: 2024-03-28

## (undated) RX ORDER — SODIUM CHLORIDE 9 MG/ML
INJECTION, SOLUTION INTRAVENOUS
Status: DISPENSED
Start: 2024-03-28

## (undated) RX ORDER — LIDOCAINE HYDROCHLORIDE 10 MG/ML
INJECTION, SOLUTION EPIDURAL; INFILTRATION; INTRACAUDAL; PERINEURAL
Status: DISPENSED
Start: 2024-03-28